# Patient Record
Sex: MALE | Race: WHITE | NOT HISPANIC OR LATINO | URBAN - METROPOLITAN AREA
[De-identification: names, ages, dates, MRNs, and addresses within clinical notes are randomized per-mention and may not be internally consistent; named-entity substitution may affect disease eponyms.]

---

## 2024-11-26 ENCOUNTER — OFFICE VISIT (OUTPATIENT)
Dept: URGENT CARE | Age: 34
End: 2024-11-26
Payer: COMMERCIAL

## 2024-11-26 VITALS
TEMPERATURE: 98.4 F | DIASTOLIC BLOOD PRESSURE: 94 MMHG | BODY MASS INDEX: 28.44 KG/M2 | HEIGHT: 72 IN | HEART RATE: 80 BPM | OXYGEN SATURATION: 98 % | RESPIRATION RATE: 16 BRPM | SYSTOLIC BLOOD PRESSURE: 130 MMHG | WEIGHT: 210 LBS

## 2024-11-26 DIAGNOSIS — R19.7 DIARRHEA, UNSPECIFIED TYPE: ICD-10-CM

## 2024-11-26 DIAGNOSIS — K29.00 ACUTE GASTRITIS WITHOUT HEMORRHAGE, UNSPECIFIED GASTRITIS TYPE: Primary | ICD-10-CM

## 2024-11-26 DIAGNOSIS — R10.12 LEFT UPPER QUADRANT ABDOMINAL PAIN: ICD-10-CM

## 2024-11-26 LAB
POC APPEARANCE, URINE: CLEAR
POC BILIRUBIN, URINE: NEGATIVE
POC BLOOD, URINE: NEGATIVE
POC COLOR, URINE: YELLOW
POC GLUCOSE, URINE: NEGATIVE MG/DL
POC KETONES, URINE: NEGATIVE MG/DL
POC LEUKOCYTES, URINE: NEGATIVE
POC NITRITE,URINE: NEGATIVE
POC PH, URINE: 6 PH
POC PROTEIN, URINE: NEGATIVE MG/DL
POC SPECIFIC GRAVITY, URINE: 1.01
POC UROBILINOGEN, URINE: 0.2 EU/DL

## 2024-11-26 RX ORDER — DICYCLOMINE HYDROCHLORIDE 20 MG/1
20 TABLET ORAL 4 TIMES DAILY PRN
Qty: 60 TABLET | Refills: 0 | Status: SHIPPED | OUTPATIENT
Start: 2024-11-26

## 2024-11-26 RX ORDER — PANTOPRAZOLE SODIUM 40 MG/1
40 TABLET, DELAYED RELEASE ORAL
Qty: 30 TABLET | Refills: 11 | Status: SHIPPED | OUTPATIENT
Start: 2024-11-26 | End: 2025-11-26

## 2024-11-26 RX ORDER — FAMOTIDINE 40 MG/1
40 TABLET, FILM COATED ORAL DAILY
Qty: 30 TABLET | Refills: 0 | Status: SHIPPED | OUTPATIENT
Start: 2024-11-26 | End: 2024-12-26

## 2024-11-26 ASSESSMENT — PAIN SCALES - GENERAL: PAINLEVEL_OUTOF10: 1

## 2024-11-26 NOTE — PATIENT INSTRUCTIONS
Stool cultures as directed  Famotidine daily as directed  Pantoprazole daily as directed  Dicyclomine as needed for cramping, loose stools  Follow up with Gastroenterology if no improvement

## 2024-11-26 NOTE — PROGRESS NOTES
Subjective   Patient ID: Devin Nolasco is a 34 y.o. male. He presents today with a chief complaint of Abdominal Pain (2 months LUQ pain that is radiating, pain to touch, loss of appetite, runny stools). Patient presents with loose stools, which began approximately 3+ months ago, and a 2+ month history of LUQ abdominal pain; initially one or 2 sharp spasms daily, which has increased to a more constant ache and tenderness to palpation over the past week. His symptoms initially improved slightly with dietary changes (eating more vegetables and eliminating beer and decreasing alcohol consumption) but despite those changes, symptoms worsened over the past week. He denies fevers, chills, nausea and vomiting. There has been no blood or mucus in his stools. He denies weight changes. No urinary symptoms are reported.   Records reviewed; patient underwent EGD in 2015 for dark tarry stools; H pylori biopsy was negative at that time.    History of Present Illness  HPI    Past Medical History  Allergies as of 11/26/2024    (No Known Allergies)       (Not in a hospital admission)       No past medical history on file.    No past surgical history on file.     reports that he has never smoked. He has never used smokeless tobacco.    Review of Systems  Review of Systems                               Objective    Vitals:    11/26/24 1640   BP: (!) 130/94   BP Location: Left arm   Patient Position: Sitting   BP Cuff Size: Adult   Pulse: 80   Resp: 16   Temp: 36.9 °C (98.4 °F)   TempSrc: Oral   SpO2: 98%   Weight: 95.3 kg (210 lb)   Height: 1.829 m (6')     No LMP for male patient.    Physical Exam  Constitutional:       General: He is not in acute distress.     Appearance: Normal appearance. He is not toxic-appearing or diaphoretic.   HENT:      Mouth/Throat:      Mouth: Mucous membranes are moist.      Pharynx: Oropharynx is clear.   Eyes:      Extraocular Movements: Extraocular movements intact.      Conjunctiva/sclera: Conjunctivae  normal.      Pupils: Pupils are equal, round, and reactive to light.   Cardiovascular:      Rate and Rhythm: Normal rate and regular rhythm.      Pulses: Normal pulses.   Pulmonary:      Effort: Pulmonary effort is normal.   Abdominal:      General: Abdomen is flat. Bowel sounds are normal.      Palpations: Abdomen is soft.      Tenderness: There is abdominal tenderness in the left upper quadrant. There is no right CVA tenderness, left CVA tenderness, guarding or rebound.      Hernia: No hernia is present.   Musculoskeletal:      Cervical back: Normal range of motion and neck supple.   Neurological:      Mental Status: He is alert.         Procedures    Point of Care Test & Imaging Results from this visit  Results for orders placed or performed in visit on 11/26/24   POCT UA Automated manually resulted   Result Value Ref Range    POC Color, Urine Yellow Straw, Yellow, Light-Yellow    POC Appearance, Urine Clear Clear    POC Glucose, Urine NEGATIVE NEGATIVE mg/dl    POC Bilirubin, Urine NEGATIVE NEGATIVE    POC Ketones, Urine NEGATIVE NEGATIVE mg/dl    POC Specific Gravity, Urine 1.010 1.005 - 1.035    POC Blood, Urine NEGATIVE NEGATIVE    POC PH, Urine 6.0 No Reference Range Established PH    POC Protein, Urine NEGATIVE NEGATIVE, 30 (1+) mg/dl    POC Urobilinogen, Urine 0.2 0.2, 1.0 EU/DL    Poc Nitrite, Urine NEGATIVE NEGATIVE    POC Leukocytes, Urine NEGATIVE NEGATIVE      No results found.    Diagnostic study results (if any) were reviewed by Kristy Johnson MD.    Assessment/Plan   Allergies, medications, history, and pertinent labs/EKGs/Imaging reviewed by Kristy Johnson MD.       Orders and Diagnoses  Diagnoses and all orders for this visit:  Left upper quadrant abdominal pain  -     POCT UA Automated manually resulted      Medical Admin Record      Patient disposition: Home    Electronically signed by Kristy Johnson MD  4:56 PM

## 2024-11-27 ENCOUNTER — LAB (OUTPATIENT)
Dept: LAB | Facility: LAB | Age: 34
End: 2024-11-27
Payer: COMMERCIAL

## 2024-11-27 LAB — C DIF TOX TCDA+TCDB STL QL NAA+PROBE: NOT DETECTED

## 2024-11-27 PROCEDURE — 87506 IADNA-DNA/RNA PROBE TQ 6-11: CPT

## 2024-11-27 PROCEDURE — 87493 C DIFF AMPLIFIED PROBE: CPT

## 2024-11-28 LAB

## 2025-02-12 ENCOUNTER — APPOINTMENT (OUTPATIENT)
Dept: GASTROENTEROLOGY | Facility: CLINIC | Age: 35
End: 2025-02-12
Payer: COMMERCIAL

## 2025-03-13 ENCOUNTER — APPOINTMENT (OUTPATIENT)
Dept: GASTROENTEROLOGY | Facility: CLINIC | Age: 35
End: 2025-03-13
Payer: COMMERCIAL

## 2025-05-08 NOTE — PROGRESS NOTES
"Subjective   Patient ID: Devin Nolasco is a 34 y.o. male who presents for Abdominal Pain (Pt states around Nov. 2024 for left sided abd pain with bowel movements being \"mush or mud\" and darker in color. Reports eating healthy with no added sugars. No personal hx colonosopy, maternal grandmother had stomach cancer. ).  HPI    In Nov 24 had LUQ pain. It stayed for a 1-2 days.   NO associated symptoms.   Urgent care :   Started on PPI and dicyclomine.  Felt better but still has symptoms. On and off,  Minor pain here and there and it may last 1 hr.   No wt. Loss    BM: Fluctuate but usually loose.  No association with blood  Family history no family history of IBD or colorectal cancer  Denies any NSAID use  Medications Ordered Prior to Encounter[1]     Review of Systems   Constitutional: Negative.    Respiratory: Negative.     Cardiovascular: Negative.    Gastrointestinal:  Positive for abdominal pain.   Endocrine: Negative.    Genitourinary: Negative.    Skin: Negative.    Neurological: Negative.        Objective   Physical Exam  Constitutional:       Appearance: Normal appearance.   HENT:      Head: Normocephalic and atraumatic.   Cardiovascular:      Rate and Rhythm: Normal rate and regular rhythm.   Pulmonary:      Effort: Pulmonary effort is normal.      Breath sounds: Normal breath sounds.   Abdominal:      General: Abdomen is flat. Bowel sounds are normal.      Palpations: Abdomen is soft.      Tenderness: There is abdominal tenderness.   Musculoskeletal:         General: Normal range of motion.   Skin:     General: Skin is warm.   Neurological:      General: No focal deficit present.      Mental Status: He is alert.       /78 (BP Location: Left arm, Patient Position: Sitting, BP Cuff Size: Adult)   Pulse 82   Resp 18   Ht 1.829 m (6')   Wt 90.5 kg (199 lb 9.6 oz)   SpO2 97%   BMI 27.07 kg/m²      No results found for: \"WBC\", \"HGB\", \"HCT\", \"MCV\", \"PLT\"        No lab exists for component: \"LABALBU\"    No " "results found for: \"AFP\"  No results found for: \"TSH\"      [facility]  Outside Information     GI UPPER W SMALL BOWEL SERIES    Anatomical Region Laterality Modality   -- -- Other     Impression    IMPRESSION: Unremarkable studies          : PSC  Transcribe Date/Time: Nov 5 2015  9:50A    Dictated by : ANDREWS LEGER MD    This examination was interpreted and the report reviewed and  electronically signed by: ANDREWS LEGER MD On Nov 5 2015  9:50AM  Narrative    * * *Final Report* * *    DATE OF EXAM: Nov 5 2015  9:15AM      MDX   9116  -  GI UPPER + SMALL BOWEL  / ACCESSION #  [id number]    PROCEDURE REASON: black tarry stools k92.1 luq pain r10.12  * * * * Physician Interpretation * * * *    HISTORY: Left upper quadrant pain, black tarry stools    TECHNIQUE: Double contrast upper GI and spot and overhead films and  fluoroscopy of the small bowel.  Fluoroscopic Radiation Summary:    Plane A, Air Kerma:  33.0 mGy  Dose Area Product (DAP):   0.0 mGy*cm\E\S\E\2  Fluoro time:  1:19 min:sec          COMPARISON: None    RESULT: Distal esophagus stomach and duodenum appeared unremarkable  without mass, ulceration, esophageal reflux, or other abnormalities.    Films of the barium-filled small bowel show normal mucosal fold pattern  and caliber and concentration of contrast material.  Terminal ileum is  normal on spot views.  Exam End: --       Patient with 6-month history of left upper quadrant abdominal pain has been on PPI with improvement in symptoms but continues to have intermittent abdominal pain with loose bowel movements.  On physical examination today he has left upper quadrant tenderness with palpation.  Assessment/Plan   Diagnoses and all orders for this visit:  LUQ pain  -     Esophagogastroduodenoscopy (EGD); Future  -     Celiac Panel; Future  Acute gastritis without hemorrhage, unspecified gastritis type  -     Referral to Gastroenterology  -     CBC and Auto Differential; Future  -     " Calprotectin Stool; Future  -     Esophagogastroduodenoscopy (EGD); Future  -     Celiac Panel; Future  Change in bowel habits    Continues to have symptoms while on PPI. Will proceed with EGD,.  Further management will be based on blood work, stool test, endoscopy results.                    [1]   Current Outpatient Medications on File Prior to Visit   Medication Sig Dispense Refill    dicyclomine (Bentyl) 20 mg tablet Take 1 tablet (20 mg) by mouth 4 times a day as needed (cramping). 60 tablet 0    famotidine (Pepcid) 40 mg tablet Take 1 tablet (40 mg) by mouth once daily. 30 tablet 0    pantoprazole (ProtoNix) 40 mg EC tablet Take 1 tablet (40 mg) by mouth once daily in the morning. Take before meals. Do not crush, chew, or split. 30 tablet 11     No current facility-administered medications on file prior to visit.

## 2025-05-08 NOTE — H&P (VIEW-ONLY)
"Subjective   Patient ID: Devin Nolasco is a 34 y.o. male who presents for Abdominal Pain (Pt states around Nov. 2024 for left sided abd pain with bowel movements being \"mush or mud\" and darker in color. Reports eating healthy with no added sugars. No personal hx colonosopy, maternal grandmother had stomach cancer. ).  HPI    In Nov 24 had LUQ pain. It stayed for a 1-2 days.   NO associated symptoms.   Urgent care :   Started on PPI and dicyclomine.  Felt better but still has symptoms. On and off,  Minor pain here and there and it may last 1 hr.   No wt. Loss    BM: Fluctuate but usually loose.  No association with blood  Family history no family history of IBD or colorectal cancer  Denies any NSAID use  Medications Ordered Prior to Encounter[1]     Review of Systems   Constitutional: Negative.    Respiratory: Negative.     Cardiovascular: Negative.    Gastrointestinal:  Positive for abdominal pain.   Endocrine: Negative.    Genitourinary: Negative.    Skin: Negative.    Neurological: Negative.        Objective   Physical Exam  Constitutional:       Appearance: Normal appearance.   HENT:      Head: Normocephalic and atraumatic.   Cardiovascular:      Rate and Rhythm: Normal rate and regular rhythm.   Pulmonary:      Effort: Pulmonary effort is normal.      Breath sounds: Normal breath sounds.   Abdominal:      General: Abdomen is flat. Bowel sounds are normal.      Palpations: Abdomen is soft.      Tenderness: There is abdominal tenderness.   Musculoskeletal:         General: Normal range of motion.   Skin:     General: Skin is warm.   Neurological:      General: No focal deficit present.      Mental Status: He is alert.       /78 (BP Location: Left arm, Patient Position: Sitting, BP Cuff Size: Adult)   Pulse 82   Resp 18   Ht 1.829 m (6')   Wt 90.5 kg (199 lb 9.6 oz)   SpO2 97%   BMI 27.07 kg/m²      No results found for: \"WBC\", \"HGB\", \"HCT\", \"MCV\", \"PLT\"        No lab exists for component: \"LABALBU\"    No " "results found for: \"AFP\"  No results found for: \"TSH\"      [facility]  Outside Information     GI UPPER W SMALL BOWEL SERIES    Anatomical Region Laterality Modality   -- -- Other     Impression    IMPRESSION: Unremarkable studies          : PSC  Transcribe Date/Time: Nov 5 2015  9:50A    Dictated by : ANDERWS LEGER MD    This examination was interpreted and the report reviewed and  electronically signed by: ANDREWS LEGER MD On Nov 5 2015  9:50AM  Narrative    * * *Final Report* * *    DATE OF EXAM: Nov 5 2015  9:15AM      MDX   9116  -  GI UPPER + SMALL BOWEL  / ACCESSION #  [id number]    PROCEDURE REASON: black tarry stools k92.1 luq pain r10.12  * * * * Physician Interpretation * * * *    HISTORY: Left upper quadrant pain, black tarry stools    TECHNIQUE: Double contrast upper GI and spot and overhead films and  fluoroscopy of the small bowel.  Fluoroscopic Radiation Summary:    Plane A, Air Kerma:  33.0 mGy  Dose Area Product (DAP):   0.0 mGy*cm\E\S\E\2  Fluoro time:  1:19 min:sec          COMPARISON: None    RESULT: Distal esophagus stomach and duodenum appeared unremarkable  without mass, ulceration, esophageal reflux, or other abnormalities.    Films of the barium-filled small bowel show normal mucosal fold pattern  and caliber and concentration of contrast material.  Terminal ileum is  normal on spot views.  Exam End: --       Patient with 6-month history of left upper quadrant abdominal pain has been on PPI with improvement in symptoms but continues to have intermittent abdominal pain with loose bowel movements.  On physical examination today he has left upper quadrant tenderness with palpation.  Assessment/Plan   Diagnoses and all orders for this visit:  LUQ pain  -     Esophagogastroduodenoscopy (EGD); Future  -     Celiac Panel; Future  Acute gastritis without hemorrhage, unspecified gastritis type  -     Referral to Gastroenterology  -     CBC and Auto Differential; Future  -     " Calprotectin Stool; Future  -     Esophagogastroduodenoscopy (EGD); Future  -     Celiac Panel; Future  Change in bowel habits    Continues to have symptoms while on PPI. Will proceed with EGD,.  Further management will be based on blood work, stool test, endoscopy results.                    [1]   Current Outpatient Medications on File Prior to Visit   Medication Sig Dispense Refill    dicyclomine (Bentyl) 20 mg tablet Take 1 tablet (20 mg) by mouth 4 times a day as needed (cramping). 60 tablet 0    famotidine (Pepcid) 40 mg tablet Take 1 tablet (40 mg) by mouth once daily. 30 tablet 0    pantoprazole (ProtoNix) 40 mg EC tablet Take 1 tablet (40 mg) by mouth once daily in the morning. Take before meals. Do not crush, chew, or split. 30 tablet 11     No current facility-administered medications on file prior to visit.

## 2025-06-04 ENCOUNTER — TELEPHONE (OUTPATIENT)
Dept: PRIMARY CARE | Facility: CLINIC | Age: 35
End: 2025-06-04
Payer: COMMERCIAL

## 2025-06-05 ENCOUNTER — APPOINTMENT (OUTPATIENT)
Dept: GASTROENTEROLOGY | Facility: CLINIC | Age: 35
End: 2025-06-05
Payer: COMMERCIAL

## 2025-06-05 VITALS
HEART RATE: 82 BPM | HEIGHT: 72 IN | DIASTOLIC BLOOD PRESSURE: 78 MMHG | OXYGEN SATURATION: 97 % | BODY MASS INDEX: 27.04 KG/M2 | RESPIRATION RATE: 18 BRPM | WEIGHT: 199.6 LBS | SYSTOLIC BLOOD PRESSURE: 122 MMHG

## 2025-06-05 DIAGNOSIS — R19.4 CHANGE IN BOWEL HABITS: ICD-10-CM

## 2025-06-05 DIAGNOSIS — K29.00 ACUTE GASTRITIS WITHOUT HEMORRHAGE, UNSPECIFIED GASTRITIS TYPE: ICD-10-CM

## 2025-06-05 DIAGNOSIS — R10.12 LUQ PAIN: Primary | ICD-10-CM

## 2025-06-05 PROCEDURE — 99204 OFFICE O/P NEW MOD 45 MIN: CPT | Performed by: INTERNAL MEDICINE

## 2025-06-05 PROCEDURE — 3008F BODY MASS INDEX DOCD: CPT | Performed by: INTERNAL MEDICINE

## 2025-06-05 PROCEDURE — 1036F TOBACCO NON-USER: CPT | Performed by: INTERNAL MEDICINE

## 2025-06-05 ASSESSMENT — ENCOUNTER SYMPTOMS
ENDOCRINE NEGATIVE: 1
CONSTITUTIONAL NEGATIVE: 1
ABDOMINAL PAIN: 1
RESPIRATORY NEGATIVE: 1
NEUROLOGICAL NEGATIVE: 1
CARDIOVASCULAR NEGATIVE: 1

## 2025-06-05 NOTE — PATIENT INSTRUCTIONS
Diagnoses and all orders for this visit:  LUQ pain  -     Esophagogastroduodenoscopy (EGD); Future  Acute gastritis without hemorrhage, unspecified gastritis type  -     Referral to Gastroenterology  -     CBC and Auto Differential; Future  -     Calprotectin Stool; Future  -     Esophagogastroduodenoscopy (EGD); Future    Continues to have symptoms while on PPI. Will proceed with EGD,.

## 2025-06-06 ENCOUNTER — ANESTHESIA EVENT (OUTPATIENT)
Dept: GASTROENTEROLOGY | Facility: EXTERNAL LOCATION | Age: 35
End: 2025-06-06

## 2025-06-06 ENCOUNTER — APPOINTMENT (OUTPATIENT)
Dept: PRIMARY CARE | Facility: CLINIC | Age: 35
End: 2025-06-06
Payer: COMMERCIAL

## 2025-06-06 LAB
BASOPHILS # BLD AUTO: 51 CELLS/UL (ref 0–200)
BASOPHILS NFR BLD AUTO: 1.5 %
EOSINOPHIL # BLD AUTO: 292 CELLS/UL (ref 15–500)
EOSINOPHIL NFR BLD AUTO: 8.6 %
ERYTHROCYTE [DISTWIDTH] IN BLOOD BY AUTOMATED COUNT: 13.3 % (ref 11–15)
GLIADIN IGA SER IA-ACNC: <1 U/ML
GLIADIN IGG SER IA-ACNC: <1 U/ML
HCT VFR BLD AUTO: 46.1 % (ref 38.5–50)
HGB BLD-MCNC: 15 G/DL (ref 13.2–17.1)
IGA SERPL-MCNC: 169 MG/DL (ref 47–310)
LYMPHOCYTES # BLD AUTO: 884 CELLS/UL (ref 850–3900)
LYMPHOCYTES NFR BLD AUTO: 26 %
MCH RBC QN AUTO: 30.7 PG (ref 27–33)
MCHC RBC AUTO-ENTMCNC: 32.5 G/DL (ref 32–36)
MCV RBC AUTO: 94.5 FL (ref 80–100)
MONOCYTES # BLD AUTO: 252 CELLS/UL (ref 200–950)
MONOCYTES NFR BLD AUTO: 7.4 %
NEUTROPHILS # BLD AUTO: 1921 CELLS/UL (ref 1500–7800)
NEUTROPHILS NFR BLD AUTO: 56.5 %
PLATELET # BLD AUTO: 232 THOUSAND/UL (ref 140–400)
PMV BLD REES-ECKER: 11.7 FL (ref 7.5–12.5)
RBC # BLD AUTO: 4.88 MILLION/UL (ref 4.2–5.8)
TTG IGA SER-ACNC: <1 U/ML
TTG IGG SER-ACNC: <1 U/ML
WBC # BLD AUTO: 3.4 THOUSAND/UL (ref 3.8–10.8)

## 2025-06-09 ASSESSMENT — PROMIS GLOBAL HEALTH SCALE
RATE_SOCIAL_SATISFACTION: VERY GOOD
RATE_MENTAL_HEALTH: GOOD
RATE_AVERAGE_PAIN: 1
CARRYOUT_PHYSICAL_ACTIVITIES: COMPLETELY
RATE_QUALITY_OF_LIFE: VERY GOOD
RATE_GENERAL_HEALTH: VERY GOOD
CARRYOUT_SOCIAL_ACTIVITIES: VERY GOOD
RATE_PHYSICAL_HEALTH: VERY GOOD
EMOTIONAL_PROBLEMS: SOMETIMES
RATE_AVERAGE_FATIGUE: MILD

## 2025-06-12 LAB — CALPROTECTIN STL-MCNT: 11 MCG/G

## 2025-06-16 ENCOUNTER — PATIENT MESSAGE (OUTPATIENT)
Dept: PRIMARY CARE | Facility: CLINIC | Age: 35
End: 2025-06-16

## 2025-06-16 ENCOUNTER — APPOINTMENT (OUTPATIENT)
Dept: PRIMARY CARE | Facility: CLINIC | Age: 35
End: 2025-06-16
Payer: COMMERCIAL

## 2025-06-16 VITALS
DIASTOLIC BLOOD PRESSURE: 82 MMHG | SYSTOLIC BLOOD PRESSURE: 118 MMHG | BODY MASS INDEX: 27.36 KG/M2 | HEIGHT: 72 IN | TEMPERATURE: 96.8 F | WEIGHT: 202 LBS | HEART RATE: 65 BPM

## 2025-06-16 DIAGNOSIS — Z13.0 SCREENING FOR IRON DEFICIENCY ANEMIA: ICD-10-CM

## 2025-06-16 DIAGNOSIS — Z00.00 ADULT GENERAL MEDICAL EXAM: Primary | ICD-10-CM

## 2025-06-16 DIAGNOSIS — Z13.220 SCREENING, LIPID: ICD-10-CM

## 2025-06-16 DIAGNOSIS — Z13.29 SCREENING FOR THYROID DISORDER: ICD-10-CM

## 2025-06-16 DIAGNOSIS — Z13.228 SCREENING FOR METABOLIC DISORDER: ICD-10-CM

## 2025-06-16 PROBLEM — J30.9 ALLERGIC RHINITIS: Status: ACTIVE | Noted: 2019-04-24

## 2025-06-16 PROCEDURE — 1036F TOBACCO NON-USER: CPT | Performed by: FAMILY MEDICINE

## 2025-06-16 PROCEDURE — 3008F BODY MASS INDEX DOCD: CPT | Performed by: FAMILY MEDICINE

## 2025-06-16 PROCEDURE — 99385 PREV VISIT NEW AGE 18-39: CPT | Performed by: FAMILY MEDICINE

## 2025-06-16 ASSESSMENT — ENCOUNTER SYMPTOMS
DIFFICULTY URINATING: 0
DYSPHORIC MOOD: 0
BLOOD IN STOOL: 0
CHEST TIGHTNESS: 0
UNEXPECTED WEIGHT CHANGE: 0
FATIGUE: 0
SHORTNESS OF BREATH: 0
SLEEP DISTURBANCE: 0
ARTHRALGIAS: 0
JOINT SWELLING: 0
PALPITATIONS: 0
NERVOUS/ANXIOUS: 0
NAUSEA: 1
CONSTIPATION: 0
BACK PAIN: 0
HEADACHES: 1
VOMITING: 0
COUGH: 0
ACTIVITY CHANGE: 0

## 2025-06-16 ASSESSMENT — PATIENT HEALTH QUESTIONNAIRE - PHQ9
1. LITTLE INTEREST OR PLEASURE IN DOING THINGS: NOT AT ALL
2. FEELING DOWN, DEPRESSED OR HOPELESS: NOT AT ALL
SUM OF ALL RESPONSES TO PHQ9 QUESTIONS 1 AND 2: 0

## 2025-06-16 NOTE — PROGRESS NOTES
Subjective   Patient ID: Devin Nolasco is a 34 y.o. male who presents for Annual Exam (awv).    HPI  Present since May 2022.  Needs physical form completed, update wellness.  Nutrition: more fruits/veggies, fresh [employer] share for more fruits/veggies; water. No soda/sugar added beverages.  Coffee-1 cup/day  Exercise:  cardio/weights/soccer 4-5 days/week  Sleep: ok sleep onset/maintenance; 7-8 hrs/nt  Eye: utd; no corrective lenses  Dental: utd      Review of Systems   Constitutional:  Negative for activity change, fatigue and unexpected weight change.   HENT:  Negative for tinnitus.    Eyes:  Negative for visual disturbance.   Respiratory:  Negative for cough, chest tightness and shortness of breath.    Cardiovascular:  Negative for chest pain and palpitations.   Gastrointestinal:  Positive for nausea (with concussion). Negative for blood in stool, constipation and vomiting.   Genitourinary:  Negative for difficulty urinating.   Musculoskeletal:  Negative for arthralgias, back pain and joint swelling.   Neurological:  Positive for headaches (concussion from soccer ball).   Psychiatric/Behavioral:  Negative for dysphoric mood and sleep disturbance. The patient is not nervous/anxious.        Objective   /82 (BP Location: Left arm, Patient Position: Sitting)   Pulse 65   Temp 36 °C (96.8 °F)   Ht 1.829 m (6')   Wt 91.6 kg (202 lb)   BMI 27.40 kg/m²     Physical Exam  Vitals reviewed.   Constitutional:       General: He is not in acute distress.     Appearance: Normal appearance. He is normal weight. He is not ill-appearing.   HENT:      Head: Normocephalic and atraumatic.      Right Ear: Tympanic membrane and ear canal normal.      Left Ear: Tympanic membrane and ear canal normal.      Nose: Nose normal. No congestion or rhinorrhea.      Mouth/Throat:      Mouth: Mucous membranes are moist.      Pharynx: Oropharynx is clear.   Eyes:      Extraocular Movements: Extraocular movements intact.       Conjunctiva/sclera: Conjunctivae normal.      Pupils: Pupils are equal, round, and reactive to light.   Cardiovascular:      Rate and Rhythm: Normal rate and regular rhythm.      Pulses: Normal pulses.      Heart sounds: Normal heart sounds. No murmur heard.  Pulmonary:      Effort: Pulmonary effort is normal. No respiratory distress.      Breath sounds: Normal breath sounds.   Abdominal:      General: Abdomen is flat. Bowel sounds are normal.      Palpations: Abdomen is soft.      Tenderness: There is no abdominal tenderness.   Musculoskeletal:         General: Normal range of motion.      Cervical back: Normal range of motion and neck supple.   Lymphadenopathy:      Cervical: No cervical adenopathy.   Skin:     General: Skin is warm and dry.   Neurological:      General: No focal deficit present.      Mental Status: He is alert and oriented to person, place, and time. Mental status is at baseline.      Cranial Nerves: No cranial nerve deficit.      Sensory: No sensory deficit.      Gait: Gait normal.      Deep Tendon Reflexes: Reflexes normal.   Psychiatric:         Mood and Affect: Mood normal.         Behavior: Behavior normal.         Thought Content: Thought content normal.         Judgment: Judgment normal.         Assessment/Plan   Assessment & Plan  Adult general medical exam  Continue healthy food choices and regular physical activity  Reviewed recommended vaccines-encouraged seasonal flu vaccine and COVID booster in the fall.  Proceed with screening lab work as ordered.  Requested patient to confirm with his brother his specific cancer diagnosis in order to appropriately determine colon cancer screening recommendations.       Screening for metabolic disorder    Orders:    Comprehensive Metabolic Panel; Future    Screening, lipid    Orders:    Lipid Panel; Future    Screening for thyroid disorder    Orders:    TSH with reflex to Free T4 if abnormal; Future    Screening for iron deficiency  anemia    Orders:    CBC and Auto Differential; Future

## 2025-06-20 ENCOUNTER — APPOINTMENT (OUTPATIENT)
Dept: GASTROENTEROLOGY | Facility: EXTERNAL LOCATION | Age: 35
End: 2025-06-20
Payer: COMMERCIAL

## 2025-06-20 ENCOUNTER — ANESTHESIA (OUTPATIENT)
Dept: GASTROENTEROLOGY | Facility: EXTERNAL LOCATION | Age: 35
End: 2025-06-20

## 2025-06-20 VITALS
WEIGHT: 194 LBS | SYSTOLIC BLOOD PRESSURE: 124 MMHG | HEIGHT: 72 IN | DIASTOLIC BLOOD PRESSURE: 72 MMHG | TEMPERATURE: 97.5 F | HEART RATE: 83 BPM | OXYGEN SATURATION: 97 % | RESPIRATION RATE: 14 BRPM | BODY MASS INDEX: 26.28 KG/M2

## 2025-06-20 DIAGNOSIS — R10.12 LUQ PAIN: ICD-10-CM

## 2025-06-20 DIAGNOSIS — K29.00 ACUTE GASTRITIS WITHOUT HEMORRHAGE, UNSPECIFIED GASTRITIS TYPE: Primary | ICD-10-CM

## 2025-06-20 PROCEDURE — 88305 TISSUE EXAM BY PATHOLOGIST: CPT | Mod: TC,ELYLAB | Performed by: INTERNAL MEDICINE

## 2025-06-20 PROCEDURE — 88305 TISSUE EXAM BY PATHOLOGIST: CPT | Performed by: STUDENT IN AN ORGANIZED HEALTH CARE EDUCATION/TRAINING PROGRAM

## 2025-06-20 PROCEDURE — 43239 EGD BIOPSY SINGLE/MULTIPLE: CPT | Performed by: INTERNAL MEDICINE

## 2025-06-20 RX ORDER — LIDOCAINE HYDROCHLORIDE 20 MG/ML
INJECTION, SOLUTION INFILTRATION; PERINEURAL AS NEEDED
Status: DISCONTINUED | OUTPATIENT
Start: 2025-06-20 | End: 2025-06-20

## 2025-06-20 RX ORDER — PROPOFOL 10 MG/ML
INJECTION, EMULSION INTRAVENOUS AS NEEDED
Status: DISCONTINUED | OUTPATIENT
Start: 2025-06-20 | End: 2025-06-20

## 2025-06-20 RX ORDER — SODIUM CHLORIDE 9 MG/ML
INJECTION, SOLUTION INTRAVENOUS CONTINUOUS PRN
Status: DISCONTINUED | OUTPATIENT
Start: 2025-06-20 | End: 2025-06-20

## 2025-06-20 RX ADMIN — LIDOCAINE HYDROCHLORIDE 100 MG: 20 INJECTION, SOLUTION INFILTRATION; PERINEURAL at 08:53

## 2025-06-20 RX ADMIN — SODIUM CHLORIDE: 9 INJECTION, SOLUTION INTRAVENOUS at 08:53

## 2025-06-20 RX ADMIN — PROPOFOL 100 MG: 10 INJECTION, EMULSION INTRAVENOUS at 08:53

## 2025-06-20 SDOH — HEALTH STABILITY: MENTAL HEALTH: CURRENT SMOKER: 0

## 2025-06-20 ASSESSMENT — PAIN - FUNCTIONAL ASSESSMENT
PAIN_FUNCTIONAL_ASSESSMENT: 0-10

## 2025-06-20 ASSESSMENT — COLUMBIA-SUICIDE SEVERITY RATING SCALE - C-SSRS
2. HAVE YOU ACTUALLY HAD ANY THOUGHTS OF KILLING YOURSELF?: NO
1. IN THE PAST MONTH, HAVE YOU WISHED YOU WERE DEAD OR WISHED YOU COULD GO TO SLEEP AND NOT WAKE UP?: NO
6. HAVE YOU EVER DONE ANYTHING, STARTED TO DO ANYTHING, OR PREPARED TO DO ANYTHING TO END YOUR LIFE?: NO

## 2025-06-20 ASSESSMENT — PAIN SCALES - GENERAL
PAINLEVEL_OUTOF10: 0 - NO PAIN

## 2025-06-20 NOTE — DISCHARGE INSTRUCTIONS

## 2025-06-20 NOTE — ANESTHESIA POSTPROCEDURE EVALUATION
Patient: Devin Nolasco    Procedure Summary       Date: 06/20/25 Room / Location: [facility]    Anesthesia Start: 0850 Anesthesia Stop: 0900    Procedure: EGD Diagnosis:       Acute gastritis without hemorrhage, unspecified gastritis type      LUQ pain      Acute gastritis without hemorrhage, unspecified gastritis type    Scheduled Providers: Braden Vasquez MD Responsible Provider: RUEL Sky    Anesthesia Type: MAC ASA Status: 1            Anesthesia Type: MAC    Vitals Value Taken Time   /71 06/20/25 09:00   Temp 36.4 °C (97.5 °F) 06/20/25 09:00   Pulse 91 06/20/25 09:00   Resp 15 06/20/25 09:00   SpO2 97 % 06/20/25 09:00       Anesthesia Post Evaluation    Patient participation: complete - patient participated  Level of consciousness: awake  Pain management: adequate  Airway patency: patent  Cardiovascular status: acceptable  Respiratory status: acceptable  Hydration status: acceptable  Postoperative Nausea and Vomiting: none        No notable events documented.

## 2025-06-20 NOTE — ANESTHESIA PREPROCEDURE EVALUATION
Patient: Devin Nolasco    Procedure Information       Date/Time: 06/20/25 0830    Scheduled providers: Braden Vasquez MD    Procedure: EGD    Location: [facility]            Relevant Problems   No relevant active problems       Clinical information reviewed:   Tobacco  Allergies  Meds   Med Hx  Surg Hx   Fam Hx  Soc Hx        NPO Detail:  NPO/Void Status  Date of Last Liquid: 06/19/25  Time of Last Liquid: 2200  Date of Last Solid: 06/19/25  Time of Last Solid: 1800  Last Intake Type: Clear fluids         Physical Exam    Airway  Mallampati: I  TM distance: >3 FB  Neck ROM: full  Mouth opening: 3 or more finger widths     Cardiovascular - normal exam   Dental - normal exam     Pulmonary - normal exam   Abdominal - normal exam         Anesthesia Plan    History of general anesthesia?: yes  History of complications of general anesthesia?: no    ASA 1     MAC     The patient is not a current smoker.  Patient was not previously instructed to abstain from smoking on day of procedure.  Patient did not smoke on day of procedure.    intravenous induction   Anesthetic plan and risks discussed with patient.

## 2025-06-27 LAB
LABORATORY COMMENT REPORT: NORMAL
PATH REPORT.FINAL DX SPEC: NORMAL
PATH REPORT.GROSS SPEC: NORMAL
PATH REPORT.RELEVANT HX SPEC: NORMAL
PATH REPORT.TOTAL CANCER: NORMAL

## 2025-06-28 NOTE — RESULT ENCOUNTER NOTE
During recent upper endoscopy biopsies were taken from the stomach.  Pathology results are normal which means no evidence of infection was seen.  If you continue to have GI symptoms please contact my office next to be a CT scan of abdomen and pelvis.  Do not hesitate to contact me if you have any questions or concerns.  Sincerely,